# Patient Record
Sex: FEMALE | Race: WHITE | NOT HISPANIC OR LATINO | Employment: FULL TIME | URBAN - METROPOLITAN AREA
[De-identification: names, ages, dates, MRNs, and addresses within clinical notes are randomized per-mention and may not be internally consistent; named-entity substitution may affect disease eponyms.]

---

## 2023-04-24 LAB
ABO GROUP (TYPE) IN BLOOD: NORMAL
ANTIBODY SCREEN: NORMAL
ERYTHROCYTE DISTRIBUTION WIDTH (RATIO) BY AUTOMATED COUNT: 12.5 % (ref 11.5–14.5)
ERYTHROCYTE MEAN CORPUSCULAR HEMOGLOBIN CONCENTRATION (G/DL) BY AUTOMATED: 32.3 G/DL (ref 32–36)
ERYTHROCYTE MEAN CORPUSCULAR VOLUME (FL) BY AUTOMATED COUNT: 89 FL (ref 80–100)
ERYTHROCYTES (10*6/UL) IN BLOOD BY AUTOMATED COUNT: 4.49 X10E12/L (ref 4–5.2)
HEMATOCRIT (%) IN BLOOD BY AUTOMATED COUNT: 40 % (ref 36–46)
HEMOGLOBIN (G/DL) IN BLOOD: 12.9 G/DL (ref 12–16)
HEPATITIS B VIRUS SURFACE AG PRESENCE IN SERUM: NONREACTIVE
HEPATITIS C VIRUS AB PRESENCE IN SERUM: NONREACTIVE
HIV 1/ 2 AG/AB SCREEN: NONREACTIVE
LEUKOCYTES (10*3/UL) IN BLOOD BY AUTOMATED COUNT: 10.2 X10E9/L (ref 4.4–11.3)
PLATELETS (10*3/UL) IN BLOOD AUTOMATED COUNT: 414 X10E9/L (ref 150–450)
REFLEX ADDED, ANEMIA PANEL: NORMAL
RH FACTOR: NORMAL
RUBELLA VIRUS IGG AB: NEGATIVE
SYPHILIS TOTAL AB: NONREACTIVE

## 2023-04-25 LAB
AMPHETAMINE (PRESENCE) IN URINE BY SCREEN METHOD: NORMAL
BARBITURATES PRESENCE IN URINE BY SCREEN METHOD: NORMAL
BENZODIAZEPINE (PRESENCE) IN URINE BY SCREEN METHOD: NORMAL
CANNABINOIDS IN URINE BY SCREEN METHOD: NORMAL
COCAINE (PRESENCE) IN URINE BY SCREEN METHOD: NORMAL
DRUG SCREEN COMMENT URINE: NORMAL
ESTIMATED AVERAGE GLUCOSE FOR HBA1C: 120 MG/DL
FENTANYL URINE: NORMAL
HEMOGLOBIN A1C/HEMOGLOBIN TOTAL IN BLOOD: 5.8 %
METHADONE (PRESENCE) IN URINE BY SCREEN METHOD: NORMAL
OPIATES (PRESENCE) IN URINE BY SCREEN METHOD: NORMAL
OXYCODONE (PRESENCE) IN URINE BY SCREEN METHOD: NORMAL
PHENCYCLIDINE (PRESENCE) IN URINE BY SCREEN METHOD: NORMAL
URINE CULTURE: NORMAL

## 2023-04-27 LAB
CHLAMYDIA TRACH., AMPLIFIED: NEGATIVE
N. GONORRHEA, AMPLIFIED: NEGATIVE

## 2023-05-03 LAB
GLUCOSE THREE HOUR: 64 MG/DL
GLUCOSE TWO HOUR: 62 MG/DL
GLUCOSE, FASTING: 74 MG/DL
GLUCOSE, ONE HOUR: 77 MG/DL
GTTCM: NORMAL

## 2023-09-11 LAB
ERYTHROCYTE DISTRIBUTION WIDTH (RATIO) BY AUTOMATED COUNT: 13.5 % (ref 11.5–14.5)
ERYTHROCYTE MEAN CORPUSCULAR HEMOGLOBIN CONCENTRATION (G/DL) BY AUTOMATED: 32.9 G/DL (ref 32–36)
ERYTHROCYTE MEAN CORPUSCULAR VOLUME (FL) BY AUTOMATED COUNT: 94 FL (ref 80–100)
ERYTHROCYTES (10*6/UL) IN BLOOD BY AUTOMATED COUNT: 3.84 X10E12/L (ref 4–5.2)
GLUCOSE, 1 HR SCREEN, PREG: 71 MG/DL
HEMATOCRIT (%) IN BLOOD BY AUTOMATED COUNT: 36.2 % (ref 36–46)
HEMOGLOBIN (G/DL) IN BLOOD: 11.9 G/DL (ref 12–16)
LEUKOCYTES (10*3/UL) IN BLOOD BY AUTOMATED COUNT: 15.7 X10E9/L (ref 4.4–11.3)
PLATELETS (10*3/UL) IN BLOOD AUTOMATED COUNT: 295 X10E9/L (ref 150–450)
REFLEX ADDED, ANEMIA PANEL: ABNORMAL

## 2023-09-12 LAB — SYPHILIS TOTAL AB: NONREACTIVE

## 2023-09-20 PROBLEM — O09.899 RUBELLA NON-IMMUNE STATUS, ANTEPARTUM (HHS-HCC): Status: ACTIVE | Noted: 2023-09-20

## 2023-09-20 PROBLEM — O99.810 ABNORMAL GLUCOSE AFFECTING PREGNANCY (HHS-HCC): Status: ACTIVE | Noted: 2023-09-20

## 2023-09-20 PROBLEM — Z28.39 RUBELLA NON-IMMUNE STATUS, ANTEPARTUM (HHS-HCC): Status: ACTIVE | Noted: 2023-09-20

## 2023-09-20 PROBLEM — Z34.83 MULTIGRAVIDA IN THIRD TRIMESTER (HHS-HCC): Status: ACTIVE | Noted: 2023-09-20

## 2023-09-20 RX ORDER — ONDANSETRON 4 MG/1
4 TABLET, FILM COATED ORAL EVERY 8 HOURS PRN
COMMUNITY
End: 2023-10-18 | Stop reason: ALTCHOICE

## 2023-09-20 RX ORDER — GUAIFENESIN 1200 MG
325 TABLET, EXTENDED RELEASE 12 HR ORAL
COMMUNITY
End: 2023-12-02 | Stop reason: HOSPADM

## 2023-09-20 RX ORDER — ALBUTEROL SULFATE 90 UG/1
AEROSOL, METERED RESPIRATORY (INHALATION)
COMMUNITY

## 2023-10-02 ENCOUNTER — ROUTINE PRENATAL (OUTPATIENT)
Dept: OBSTETRICS AND GYNECOLOGY | Facility: CLINIC | Age: 23
End: 2023-10-02
Payer: MEDICAID

## 2023-10-02 ENCOUNTER — ANCILLARY PROCEDURE (OUTPATIENT)
Dept: RADIOLOGY | Facility: CLINIC | Age: 23
End: 2023-10-02
Payer: MEDICAID

## 2023-10-02 VITALS — DIASTOLIC BLOOD PRESSURE: 62 MMHG | BODY MASS INDEX: 34.19 KG/M2 | WEIGHT: 199.2 LBS | SYSTOLIC BLOOD PRESSURE: 104 MMHG

## 2023-10-02 DIAGNOSIS — Z34.83 MULTIGRAVIDA IN THIRD TRIMESTER (HHS-HCC): ICD-10-CM

## 2023-10-02 DIAGNOSIS — Z36.4 ULTRASOUND FOR ANTENATAL SCREENING FOR FETAL GROWTH RESTRICTION (HHS-HCC): ICD-10-CM

## 2023-10-02 LAB
GLUCOSE URINE, POC: NEGATIVE
URINE PROTEIN, POC: NEGATIVE

## 2023-10-02 PROCEDURE — 76816 OB US FOLLOW-UP PER FETUS: CPT | Performed by: OBSTETRICS & GYNECOLOGY

## 2023-10-02 PROCEDURE — 81002 URINALYSIS NONAUTO W/O SCOPE: CPT | Performed by: OBSTETRICS & GYNECOLOGY

## 2023-10-02 PROCEDURE — 76816 OB US FOLLOW-UP PER FETUS: CPT

## 2023-10-02 PROCEDURE — 99214 OFFICE O/P EST MOD 30 MIN: CPT | Performed by: OBSTETRICS & GYNECOLOGY

## 2023-10-02 PROCEDURE — 76819 FETAL BIOPHYS PROFIL W/O NST: CPT | Performed by: OBSTETRICS & GYNECOLOGY

## 2023-10-02 PROCEDURE — 76819 FETAL BIOPHYS PROFIL W/O NST: CPT

## 2023-10-02 NOTE — NURSING NOTE
Patient had OB us this morning patient urine protein neg and glucose negative feels like she is leaking fluid all the time

## 2023-10-02 NOTE — PROGRESS NOTES
"Subjective   Patient ID [id number]   Luciana Brown is a 23 y.o.  at 30w5d with a working estimated date of delivery of 2023, by Last Menstrual Period who presents for a routine prenatal visit. She denies vaginal bleeding, leakage of fluid, decreased fetal movements, or contractions.    chief complaint of leaking fluid clear no bleeding.    Her pregnancy is complicated by:  none    Objective   Physical Exam:   Weight: 90.4 kg (199 lb 3.2 oz)  Expected Total Weight Gain: 5 kg (11 lb)-9 kg (19 lb)   Pregravid BMI: 31.74  BP: 104/62  Fetal Heart Rate: 146               Prenatal Labs  Urine Dip:  Lab Results   Component Value Date    KETONESU 5(Trace) mg/dl (A) 2023     Lab Results   Component Value Date    HGB 11.9 (L) 2023    HCT 36.2 2023    HEPBSAG NONREACTIVE 2023     No results found for: \"PAPPA\", \"AFP\", \"HCG\", \"ESTRIOL\", \"INHBA\"  No results found for: \"GLUF\", \"GLUT1\", \"TRAADFF1PE\", \"LOZPFXW7BU\"    Imaging  The most recent ultrasound was performed on The most recent ultrasound study is not finalized with a study GA of The most recent ultrasound study is not finalized and EFW of The most recent ultrasound study is not finalized.  The most recent ultrasound study is not finalized  The most recent ultrasound study is not finalized    Assessment/Plan     Continue prenatal vitamin.  Labs reviewed.  GBS taken.  Expected mode of delivery Vaginal  Follow up in 1 week for a routine prenatal visit.  "

## 2023-10-17 ENCOUNTER — APPOINTMENT (OUTPATIENT)
Dept: OBSTETRICS AND GYNECOLOGY | Facility: CLINIC | Age: 23
End: 2023-10-17
Payer: MEDICAID

## 2023-10-18 ENCOUNTER — ROUTINE PRENATAL (OUTPATIENT)
Dept: OBSTETRICS AND GYNECOLOGY | Facility: CLINIC | Age: 23
End: 2023-10-18
Payer: MEDICAID

## 2023-10-18 VITALS — BODY MASS INDEX: 35.36 KG/M2 | SYSTOLIC BLOOD PRESSURE: 112 MMHG | DIASTOLIC BLOOD PRESSURE: 82 MMHG | WEIGHT: 206 LBS

## 2023-10-18 DIAGNOSIS — Z34.83 MULTIGRAVIDA IN THIRD TRIMESTER (HHS-HCC): Primary | ICD-10-CM

## 2023-10-18 DIAGNOSIS — Z3A.33 33 WEEKS GESTATION OF PREGNANCY (HHS-HCC): ICD-10-CM

## 2023-10-18 PROCEDURE — 99213 OFFICE O/P EST LOW 20 MIN: CPT | Performed by: OBSTETRICS & GYNECOLOGY

## 2023-10-18 NOTE — ASSESSMENT & PLAN NOTE
Patient doing well with no concerns, good fetal movement  Patient with ultrasound on October 2, results pending,  Will repeat ultrasound at 36 weeks, will schedule    PLAN:  Follow-up in 2 weeks

## 2023-10-18 NOTE — PROGRESS NOTES
Subjective   Patient ID [id number]   Luciana Allen is a 23 y.o.  at 33w0d with a working estimated date of delivery of 2023, by Last Menstrual Period who presents for a routine prenatal visit. She denies vaginal bleeding, leakage of fluid, decreased fetal movements, or contractions.    Patient doing well with no concerns.  Ultrasound from  pending    Objective   Physical Exam:   Weight: 93.4 kg (206 lb)  Expected Total Weight Gain: 5 kg (11 lb)-9 kg (19 lb)   Pregravid BMI: 31.74  BP: 112/82  Fetal Heart Rate: 150 Fundal Height (cm): 34 cm             Prenatal Labs  Urine Dip:  Lab Results   Component Value Date    KETONESU 5(Trace) mg/dl (A) 2023    GLUCOSEUR NEGATIVE 10/02/2023     Lab Results   Component Value Date    HGB 11.9 (L) 2023    HCT 36.2 2023    ABO O 2023    HEPBSAG NONREACTIVE 2023         Assessment/Plan   Problem List Items Addressed This Visit             ICD-10-CM    Multigravida in third trimester - Primary Z34.83    33 weeks gestation of pregnancy Z3A.33     Patient doing well with no concerns, good fetal movement  Patient with ultrasound on , results pending,  Will repeat ultrasound at 36 weeks, will schedule    PLAN:  Follow-up in 2 weeks

## 2023-10-23 ENCOUNTER — ANCILLARY PROCEDURE (OUTPATIENT)
Dept: RADIOLOGY | Facility: CLINIC | Age: 23
End: 2023-10-23
Payer: MEDICAID

## 2023-10-23 DIAGNOSIS — Z36.4 ULTRASOUND FOR ANTENATAL SCREENING FOR FETAL GROWTH RESTRICTION (HHS-HCC): ICD-10-CM

## 2023-10-23 PROCEDURE — 76819 FETAL BIOPHYS PROFIL W/O NST: CPT

## 2023-10-23 PROCEDURE — 76816 OB US FOLLOW-UP PER FETUS: CPT

## 2023-10-30 ENCOUNTER — APPOINTMENT (OUTPATIENT)
Dept: RADIOLOGY | Facility: CLINIC | Age: 23
End: 2023-10-30
Payer: MEDICAID

## 2023-10-30 ENCOUNTER — ROUTINE PRENATAL (OUTPATIENT)
Dept: OBSTETRICS AND GYNECOLOGY | Facility: CLINIC | Age: 23
End: 2023-10-30
Payer: MEDICAID

## 2023-10-30 VITALS — WEIGHT: 204.8 LBS | SYSTOLIC BLOOD PRESSURE: 118 MMHG | DIASTOLIC BLOOD PRESSURE: 70 MMHG | BODY MASS INDEX: 35.15 KG/M2

## 2023-10-30 DIAGNOSIS — Z3A.34 34 WEEKS GESTATION OF PREGNANCY (HHS-HCC): Primary | ICD-10-CM

## 2023-10-30 LAB
POC GLUCOSE, URINE: NEGATIVE MG/DL
POC PROTEIN, URINE: NEGATIVE MG/DL

## 2023-10-30 PROCEDURE — 90471 IMMUNIZATION ADMIN: CPT | Performed by: OBSTETRICS & GYNECOLOGY

## 2023-10-30 PROCEDURE — 99213 OFFICE O/P EST LOW 20 MIN: CPT | Performed by: OBSTETRICS & GYNECOLOGY

## 2023-10-30 PROCEDURE — 90715 TDAP VACCINE 7 YRS/> IM: CPT | Performed by: OBSTETRICS & GYNECOLOGY

## 2023-10-30 NOTE — PROGRESS NOTES
"Subjective   Patient ID [id number]   Luciana Allen is a 23 y.o.  at 34w5d with a working estimated date of delivery of 2023, by Last Menstrual Period who presents for a routine prenatal visit. She denies vaginal bleeding, leakage of fluid, decreased fetal movements, or contractions.  C/O lower back pain.   Positive [place].      Her pregnancy is complicated by:      Objective   Physical Exam:   Weight: 92.9 kg (204 lb 12.8 oz)  Expected Total Weight Gain: 5 kg (11 lb)-9 kg (19 lb)   Pregravid BMI: 31.74  BP: 118/70  Fetal Heart Rate: 155               Prenatal Labs  Urine Dip:  Lab Results   Component Value Date    KETONESU 5(Trace) mg/dl (A) 2023    GLUCOSEUR NEGATIVE 10/02/2023     Lab Results   Component Value Date    HGB 11.9 (L) 2023    HCT 36.2 2023    ABO O 2023    HEPBSAG NONREACTIVE 2023     No results found for: \"PAPPA\", \"AFP\", \"HCG\", \"ESTRIOL\", \"INHBA\"  No results found for: \"GLUF\", \"GLUT1\", \"XQGBMPX9CX\", \"DKBUUXR7NQ\"    Imaging  The most recent ultrasound was performed on The most recent ultrasound study is not finalized with a study GA of The most recent ultrasound study is not finalized and EFW of The most recent ultrasound study is not finalized.  The most recent ultrasound study is not finalized  The most recent ultrasound study is not finalized    Assessment/Plan   Problem List Items Addressed This Visit    None  Visit Diagnoses         Codes    34 weeks gestation of pregnancy    -  Primary Z3A.34    Relevant Orders    POCT UA Automated manually resulted (Completed)    Tdap vaccine, age 7 years and older  (BOOSTRIX)          Continue prenatal vitamin.  GBS next visit.  U/S follow up growth in 1 week.   Expected mode of delivery Vaginal  Follow up in 2 week for a routine prenatal visit.  "

## 2023-11-08 ENCOUNTER — ANCILLARY PROCEDURE (OUTPATIENT)
Dept: RADIOLOGY | Facility: CLINIC | Age: 23
End: 2023-11-08
Payer: MEDICAID

## 2023-11-08 DIAGNOSIS — Z3A.33 33 WEEKS GESTATION OF PREGNANCY (HHS-HCC): ICD-10-CM

## 2023-11-08 DIAGNOSIS — O99.891 MATERNAL CONGENITAL CARDIAC ANOMALY COMPLICATING PREGNANCY (HHS-HCC): ICD-10-CM

## 2023-11-08 DIAGNOSIS — Q24.9 MATERNAL CONGENITAL CARDIAC ANOMALY COMPLICATING PREGNANCY (HHS-HCC): ICD-10-CM

## 2023-11-08 DIAGNOSIS — O99.210 OBESITY IN PREGNANCY (HHS-HCC): ICD-10-CM

## 2023-11-08 PROCEDURE — 76819 FETAL BIOPHYS PROFIL W/O NST: CPT | Performed by: OBSTETRICS & GYNECOLOGY

## 2023-11-08 PROCEDURE — 76819 FETAL BIOPHYS PROFIL W/O NST: CPT

## 2023-11-08 PROCEDURE — 76816 OB US FOLLOW-UP PER FETUS: CPT | Performed by: OBSTETRICS & GYNECOLOGY

## 2023-11-08 PROCEDURE — 76816 OB US FOLLOW-UP PER FETUS: CPT

## 2023-11-10 ENCOUNTER — HOSPITAL ENCOUNTER (OUTPATIENT)
Facility: HOSPITAL | Age: 23
Discharge: HOME | End: 2023-11-10
Attending: OBSTETRICS & GYNECOLOGY | Admitting: OBSTETRICS & GYNECOLOGY
Payer: MEDICAID

## 2023-11-10 VITALS
TEMPERATURE: 97.3 F | WEIGHT: 207.45 LBS | RESPIRATION RATE: 18 BRPM | HEIGHT: 64 IN | DIASTOLIC BLOOD PRESSURE: 68 MMHG | OXYGEN SATURATION: 97 % | SYSTOLIC BLOOD PRESSURE: 117 MMHG | BODY MASS INDEX: 35.42 KG/M2 | HEART RATE: 71 BPM

## 2023-11-10 PROCEDURE — 59025 FETAL NON-STRESS TEST: CPT | Performed by: ADVANCED PRACTICE MIDWIFE

## 2023-11-10 PROCEDURE — 99213 OFFICE O/P EST LOW 20 MIN: CPT | Performed by: ADVANCED PRACTICE MIDWIFE

## 2023-11-10 PROCEDURE — 99214 OFFICE O/P EST MOD 30 MIN: CPT | Mod: 25

## 2023-11-10 SDOH — SOCIAL STABILITY: SOCIAL INSECURITY: ARE YOU OR HAVE YOU BEEN THREATENED OR ABUSED PHYSICALLY, EMOTIONALLY, OR SEXUALLY BY ANYONE?: NO

## 2023-11-10 SDOH — SOCIAL STABILITY: SOCIAL INSECURITY: DO YOU FEEL ANYONE HAS EXPLOITED OR TAKEN ADVANTAGE OF YOU FINANCIALLY OR OF YOUR PERSONAL PROPERTY?: NO

## 2023-11-10 SDOH — SOCIAL STABILITY: SOCIAL INSECURITY: ABUSE SCREEN: ADULT

## 2023-11-10 SDOH — SOCIAL STABILITY: SOCIAL INSECURITY: PHYSICAL ABUSE: DENIES

## 2023-11-10 SDOH — HEALTH STABILITY: MENTAL HEALTH: NON-SPECIFIC ACTIVE SUICIDAL THOUGHTS (PAST 1 MONTH): NO

## 2023-11-10 SDOH — HEALTH STABILITY: MENTAL HEALTH: HAVE YOU USED ANY PRESCRIPTION DRUGS OTHER THAN PRESCRIBED IN THE PAST 12 MONTHS?: NO

## 2023-11-10 SDOH — HEALTH STABILITY: MENTAL HEALTH: HAVE YOU USED ANY SUBSTANCES (CANABIS, COCAINE, HEROIN, HALLUCINOGENS, INHALANTS, ETC.) IN THE PAST 12 MONTHS?: NO

## 2023-11-10 SDOH — SOCIAL STABILITY: SOCIAL INSECURITY: ARE THERE ANY APPARENT SIGNS OF INJURIES/BEHAVIORS THAT COULD BE RELATED TO ABUSE/NEGLECT?: NO

## 2023-11-10 SDOH — ECONOMIC STABILITY: HOUSING INSECURITY: DO YOU FEEL UNSAFE GOING BACK TO THE PLACE WHERE YOU ARE LIVING?: NO

## 2023-11-10 SDOH — SOCIAL STABILITY: SOCIAL INSECURITY: HAVE YOU HAD THOUGHTS OF HARMING ANYONE ELSE?: NO

## 2023-11-10 SDOH — SOCIAL STABILITY: SOCIAL INSECURITY: HAS ANYONE EVER THREATENED TO HURT YOUR FAMILY OR YOUR PETS?: NO

## 2023-11-10 SDOH — SOCIAL STABILITY: SOCIAL INSECURITY: DOES ANYONE TRY TO KEEP YOU FROM HAVING/CONTACTING OTHER FRIENDS OR DOING THINGS OUTSIDE YOUR HOME?: NO

## 2023-11-10 SDOH — HEALTH STABILITY: MENTAL HEALTH: STRENGTHS (MUST CHOOSE TWO): SUPPORT FROM FAMILY;DEMONSTRATES EFFECTIVE COPING SKILLS

## 2023-11-10 SDOH — HEALTH STABILITY: MENTAL HEALTH: SUICIDAL BEHAVIOR (LIFETIME): NO

## 2023-11-10 SDOH — HEALTH STABILITY: MENTAL HEALTH: WISH TO BE DEAD (PAST 1 MONTH): NO

## 2023-11-10 SDOH — SOCIAL STABILITY: SOCIAL INSECURITY: VERBAL ABUSE: DENIES

## 2023-11-10 SDOH — HEALTH STABILITY: MENTAL HEALTH: WERE YOU ABLE TO COMPLETE ALL THE BEHAVIORAL HEALTH SCREENINGS?: YES

## 2023-11-10 ASSESSMENT — PATIENT HEALTH QUESTIONNAIRE - PHQ9
SUM OF ALL RESPONSES TO PHQ9 QUESTIONS 1 & 2: 0
1. LITTLE INTEREST OR PLEASURE IN DOING THINGS: NOT AT ALL
2. FEELING DOWN, DEPRESSED OR HOPELESS: NOT AT ALL

## 2023-11-10 ASSESSMENT — PAIN SCALES - GENERAL
PAINLEVEL_OUTOF10: 4
PAINLEVEL_OUTOF10: 4
PAINLEVEL: 4

## 2023-11-10 ASSESSMENT — LIFESTYLE VARIABLES
HOW OFTEN DO YOU HAVE A DRINK CONTAINING ALCOHOL: NEVER
SKIP TO QUESTIONS 9-10: 1
AUDIT-C TOTAL SCORE: 0
HOW OFTEN DO YOU HAVE 6 OR MORE DRINKS ON ONE OCCASION: NEVER
AUDIT-C TOTAL SCORE: 0
HOW MANY STANDARD DRINKS CONTAINING ALCOHOL DO YOU HAVE ON A TYPICAL DAY: PATIENT DOES NOT DRINK

## 2023-11-11 NOTE — H&P
"Obstetrical Admission History and Physical     Luciana Allen is a 23 y.o. . Low back pain    Chief Complaint: Contractions and Decreased Fetal Movement    Assessment/Plan    -IUP at 36.2 wks  -NST reactive  -back pain in pregnancy     PLAN:   -Discharge to home.   -Discussed low suspicion for PTL given cervical exam and no contractions tracing.   -Discussed continued FMCs, labor/srom/bleeding precautions.   -Discussed comfort measures including tylenol up to 1000 mg q 8 hrs, heat, positioning, benadryl or unisom for sleep.   -Keep regularly scheduled OB appointments. GBS next visit.   -Pt assessment and plan reviewed with Dr. Do.   -Pt verbalized understanding and agreement with plan.     Active Problems:  There are no active Hospital Problems.      Pregnancy Problems (from 21 to present)       Problem Noted Resolved    33 weeks gestation of pregnancy 10/18/2023 by Tristan Ramirez MD No    Priority:  Medium      Overview Addendum 2023  8:00 PM by Tristan Ramirez MD     History of bipolar and depression, PTSD:  Ultrasound : EFW 2.53 kg, 25%, normal ISIDRA, BPP                Subjective   Luciana is here complaining of back pain for the past 2 weeks that she feels may be contractions. Denies VB, LOF. States baby has had \"quiet\" moments the past 2 days but has been active since arrival to triage. For her pain, uLciana has tried 500mg of tylenol with minimal relief. She denies urinary symptoms or any other health concerns today.          Obstetrical History   OB History    Para Term  AB Living   4 2 2   1 2   SAB IAB Ectopic Multiple Live Births   1       2      # Outcome Date GA Lbr Reuben/2nd Weight Sex Delivery Anes PTL Lv   4 Current            3 SAB 23 9w5d    Incomplete S      2 Term 20 37w6d  2693 g M Vag-Spont EPI  LILY   1 Term 19 42w0d  2892 g F Vag-Spont EPI N LILY       Past Medical History  Past Medical History:   Diagnosis Date    ADD (attention " deficit disorder)     Bipolar disorder, currently in remission, most recent episode unspecified (CMS/HCC)     History of depressed bipolar disorder    Depression     Personal history of other diseases of the respiratory system     History of asthma    Personal history of other mental and behavioral disorders     History of depression    Personal history of other mental and behavioral disorders     History of attention deficit hyperactivity disorder (ADHD)    Personal history of other mental and behavioral disorders     History of anxiety    Personal history of other mental and behavioral disorders     History of attention deficit disorder    Post-traumatic stress disorder, unspecified     PTSD (post-traumatic stress disorder)        Past Surgical History   Past Surgical History:   Procedure Laterality Date    DILATION AND CURETTAGE OF UTERUS      OTHER SURGICAL HISTORY  10/24/2019    No history of surgery       Social History  Social History     Tobacco Use    Smoking status: Never     Passive exposure: Current    Smokeless tobacco: Not on file   Substance Use Topics    Alcohol use: Not Currently     Substance and Sexual Activity   Drug Use Never       Allergies  Latex     Medications  Medications Prior to Admission   Medication Sig Dispense Refill Last Dose    acetaminophen (TylenoL) 325 mg capsule Take 1 capsule (325 mg) by mouth.   Past Month    albuterol 90 mcg/actuation inhaler Inhale.   More than a month    PNV no.95/ferrous fum/folic ac (PRENATAL ORAL) Take by mouth.   11/10/2023       Objective    Last Vitals  Temp Pulse Resp BP MAP O2 Sat   36 °C (96.8 °F) 71 18 117/68   97 %     Physical Examination  GENERAL: Examination reveals a well developed, well nourished, gravid female in no acute distress. She is alert and cooperative.  LUNGS:  unlabored breathing  FHR is 145, moderate, +accels, -decels  [place] reading: quiet  CERVIX: Fingertip cm dilated, 20 % effaced, -3 station; MEMBRANES are Intact  NEUROLOGICAL:  alert, oriented, normal speech, no focal findings or movement disorder noted  PSYCHOLOGICAL: awake and alert; oriented to person, place, and time    Lab Review

## 2023-11-13 ENCOUNTER — ROUTINE PRENATAL (OUTPATIENT)
Dept: OBSTETRICS AND GYNECOLOGY | Facility: CLINIC | Age: 23
End: 2023-11-13
Payer: MEDICAID

## 2023-11-13 VITALS — DIASTOLIC BLOOD PRESSURE: 70 MMHG | SYSTOLIC BLOOD PRESSURE: 110 MMHG | BODY MASS INDEX: 35.84 KG/M2 | WEIGHT: 208.8 LBS

## 2023-11-13 DIAGNOSIS — Z3A.36 36 WEEKS GESTATION OF PREGNANCY (HHS-HCC): Primary | ICD-10-CM

## 2023-11-13 LAB
POC GLUCOSE, URINE: NEGATIVE MG/DL
POC PROTEIN, URINE: NEGATIVE MG/DL

## 2023-11-13 PROCEDURE — 99213 OFFICE O/P EST LOW 20 MIN: CPT | Performed by: OBSTETRICS & GYNECOLOGY

## 2023-11-13 PROCEDURE — 87081 CULTURE SCREEN ONLY: CPT

## 2023-11-13 NOTE — PROGRESS NOTES
"Subjective   Patient ID [id number]   Luciana Allen is a 23 y.o.  at 36w5d with a working estimated date of delivery of 2023, by Last Menstrual Period who presents for a routine prenatal visit. She denies vaginal bleeding, leakage of fluid, decreased fetal movements, or contractions.  C/O lower back.       Her pregnancy is complicated by:  Lower back pain    Objective   Physical Exam:   Weight: 94.7 kg (208 lb 12.8 oz)  Expected Total Weight Gain: 5 kg (11 lb)-9 kg (19 lb)   Pregravid BMI: 31.74  BP: 110/70  Fetal Heart Rate: 140               Prenatal Labs  Urine Dip:  Lab Results   Component Value Date    KETONESU 5(Trace) mg/dl (A) 2023    GLUCOSEUR NEGATIVE 10/02/2023     Lab Results   Component Value Date    HGB 11.9 (L) 2023    HCT 36.2 2023    ABO O 2023    HEPBSAG NONREACTIVE 2023     No results found for: \"PAPPA\", \"AFP\", \"HCG\", \"ESTRIOL\", \"INHBA\"  No results found for: \"GLUF\", \"GLUT1\", \"ZAOLEQM2LQ\", \"YFPSOFA8RH\"    Imaging  The most recent ultrasound was performed on   with a study GA of   and EFW of  .          Assessment/Plan   Problem List Items Addressed This Visit    None  Visit Diagnoses         Codes    36 weeks gestation of pregnancy    -  Primary Z3A.36    Relevant Orders    POCT UA Automated manually resulted (Completed)          Continue prenatal vitamin.  Labs reviewed.  GBS taken.  Expected mode of delivery Vaginal  Follow up in 1 week for a routine prenatal visit.  Induction at 39 weeks.   "

## 2023-11-16 LAB — GP B STREP GENITAL QL CULT: NORMAL

## 2023-11-20 ENCOUNTER — ROUTINE PRENATAL (OUTPATIENT)
Dept: OBSTETRICS AND GYNECOLOGY | Facility: CLINIC | Age: 23
End: 2023-11-20
Payer: MEDICAID

## 2023-11-20 VITALS — BODY MASS INDEX: 36.42 KG/M2 | WEIGHT: 212.2 LBS | SYSTOLIC BLOOD PRESSURE: 110 MMHG | DIASTOLIC BLOOD PRESSURE: 60 MMHG

## 2023-11-20 DIAGNOSIS — Z3A.37 37 WEEKS GESTATION OF PREGNANCY (HHS-HCC): Primary | ICD-10-CM

## 2023-11-20 LAB
POC GLUCOSE, URINE: NEGATIVE MG/DL
POC PROTEIN, URINE: NEGATIVE MG/DL

## 2023-11-20 PROCEDURE — 99213 OFFICE O/P EST LOW 20 MIN: CPT | Performed by: OBSTETRICS & GYNECOLOGY

## 2023-11-20 NOTE — PROGRESS NOTES
"Subjective   Patient ID [id number]   Luciana Allen is a 23 y.o.  at 37w5d with a working estimated date of delivery of 2023, by Last Menstrual Period who presents for a routine prenatal visit. She denies vaginal bleeding, leakage of fluid, decreased fetal movements, or contractions.  Positive cramping.     Her pregnancy is complicated by:  none    Objective   Physical Exam:   Weight: 96.3 kg (212 lb 3.2 oz)  Expected Total Weight Gain: 5 kg (11 lb)-9 kg (19 lb)   Pregravid BMI: 31.74  BP: 110/60  Fetal Heart Rate: 151               Prenatal Labs  Urine Dip:  Lab Results   Component Value Date    KETONESU 5(Trace) mg/dl (A) 2023    GLUCOSEUR NEGATIVE 10/02/2023     Lab Results   Component Value Date    HGB 11.9 (L) 2023    HCT 36.2 2023    ABO O 2023    HEPBSAG NONREACTIVE 2023     No results found for: \"PAPPA\", \"AFP\", \"HCG\", \"ESTRIOL\", \"INHBA\"  No results found for: \"GLUF\", \"GLUT1\", \"HNTNHZN9ME\", \"DDVOWLV6SV\"    Imaging  The most recent ultrasound was performed on   with a study GA of   and EFW of  .          Assessment/Plan   Problem List Items Addressed This Visit    None  Visit Diagnoses         Codes    37 weeks gestation of pregnancy    -  Primary Z3A.37    Relevant Orders    POCT UA Automated manually resulted (Completed)          Continue prenatal vitamin.  Labs reviewed.  GBS taken negative  Expected mode of delivery Vaginal  Induction next week at 39 weeks.   Follow up in 1 week for a routine prenatal visit.  "

## 2023-11-21 ENCOUNTER — HOSPITAL ENCOUNTER (OUTPATIENT)
Facility: HOSPITAL | Age: 23
Discharge: HOME | End: 2023-11-22
Attending: OBSTETRICS & GYNECOLOGY | Admitting: OBSTETRICS & GYNECOLOGY
Payer: MEDICAID

## 2023-11-21 DIAGNOSIS — Z3A.37 37 WEEKS GESTATION OF PREGNANCY (HHS-HCC): ICD-10-CM

## 2023-11-21 DIAGNOSIS — O47.9 BRAXTON HICKS CONTRACTIONS (HHS-HCC): Primary | ICD-10-CM

## 2023-11-22 VITALS
HEART RATE: 98 BPM | SYSTOLIC BLOOD PRESSURE: 126 MMHG | OXYGEN SATURATION: 96 % | DIASTOLIC BLOOD PRESSURE: 64 MMHG | RESPIRATION RATE: 18 BRPM | TEMPERATURE: 97 F | BODY MASS INDEX: 34.27 KG/M2 | WEIGHT: 199.63 LBS

## 2023-11-22 PROBLEM — O41.00X0 OLIGOHYDRAMNIOS (HHS-HCC): Status: RESOLVED | Noted: 2023-11-22 | Resolved: 2023-11-22

## 2023-11-22 PROBLEM — L25.9 CONTACT DERMATITIS: Status: RESOLVED | Noted: 2023-11-22 | Resolved: 2023-11-22

## 2023-11-22 PROBLEM — R10.2 PELVIC PRESSURE IN PREGNANCY (HHS-HCC): Status: RESOLVED | Noted: 2023-11-22 | Resolved: 2023-11-22

## 2023-11-22 PROBLEM — O26.899 PELVIC PRESSURE IN PREGNANCY (HHS-HCC): Status: RESOLVED | Noted: 2023-11-22 | Resolved: 2023-11-22

## 2023-11-22 PROBLEM — R09.89 RESPIRATORY SYMPTOMS: Status: RESOLVED | Noted: 2023-11-22 | Resolved: 2023-11-22

## 2023-11-22 PROBLEM — Z11.52 ENCOUNTER FOR SCREENING FOR COVID-19: Status: RESOLVED | Noted: 2023-11-22 | Resolved: 2023-11-22

## 2023-11-22 PROBLEM — R09.81 NASAL CONGESTION: Status: RESOLVED | Noted: 2023-11-22 | Resolved: 2023-11-22

## 2023-11-22 PROBLEM — J06.9 UPPER RESPIRATORY INFECTION, ACUTE: Status: RESOLVED | Noted: 2023-11-22 | Resolved: 2023-11-22

## 2023-11-22 PROBLEM — O47.9 BRAXTON HICKS CONTRACTIONS (HHS-HCC): Status: ACTIVE | Noted: 2023-11-22

## 2023-11-22 PROBLEM — J01.90 SINUSITIS, ACUTE: Status: RESOLVED | Noted: 2023-11-22 | Resolved: 2023-11-22

## 2023-11-22 PROCEDURE — 2500000001 HC RX 250 WO HCPCS SELF ADMINISTERED DRUGS (ALT 637 FOR MEDICARE OP): Performed by: ADVANCED PRACTICE MIDWIFE

## 2023-11-22 PROCEDURE — 99214 OFFICE O/P EST MOD 30 MIN: CPT | Performed by: ADVANCED PRACTICE MIDWIFE

## 2023-11-22 PROCEDURE — 99214 OFFICE O/P EST MOD 30 MIN: CPT

## 2023-11-22 RX ORDER — HYDROXYZINE HYDROCHLORIDE 25 MG/1
50 TABLET, FILM COATED ORAL ONCE
Status: COMPLETED | OUTPATIENT
Start: 2023-11-22 | End: 2023-11-22

## 2023-11-22 RX ADMIN — HYDROXYZINE HYDROCHLORIDE 50 MG: 25 TABLET, FILM COATED ORAL at 01:19

## 2023-11-22 NOTE — H&P
Obstetrical Admission History and Physical     Luciana Allen is a 23 y.o.  at 38w0d. CARMINE: 2023, by Last Menstrual Period who presents with complaint of contractions earlier today which have decreased in frequency and intensity now. She has had prenatal care with Dr. Yousif .    Assessment    Luciana Allen is a 23 y.o.  at 38w0d. CARMINE: 2023, by Last Menstrual Period.   FHT Category 1  [place] contractions    Plan    NST   Plan for discharge home with labor precautions; IOL planned for 39 weeks  Vistaril for rest    TONNY Caldera    Subjective     Chief Complaint: Contractions (Q. 3-5 minutes while at home)    Luciana is here complaining of q3-5 min contractions at home; not now. Good fetal movement. Denies vaginal bleeding., C/O of occasional contractions., Denies leaking of fluid.         Pregnancy Problems (from 21 to present)       Problem Noted Resolved    [place] contractions 2023 by TONNY Caldera No    Priority:  Medium      37 weeks gestation of pregnancy 2023 by Sigrid Yousif MD No    Priority:  Medium      33 weeks gestation of pregnancy 10/18/2023 by Tristan Ramirez MD No    Priority:  Medium      Overview Addendum 2023  8:00 PM by Tristan Ramirez MD     History of bipolar and depression, PTSD:  Ultrasound : EFW 2.53 kg, 25%, normal ISIDRA, BPP                   Obstetrical History   OB History    Para Term  AB Living   4 2 2   1 2   SAB IAB Ectopic Multiple Live Births   1       2      # Outcome Date GA Lbr Reuben/2nd Weight Sex Delivery Anes PTL Lv   4 Current            3 SAB 23 9w5d    Incomplete S      2 Term 20 37w6d  2693 g M Vag-Spont EPI  LILY   1 Term 19 42w0d  2892 g F Vag-Spont EPI N LILY       Past Medical History  Past Medical History:   Diagnosis Date    ADD (attention deficit disorder)     Bipolar disorder, currently in remission, most recent episode unspecified (CMS/[facility])      History of depressed bipolar disorder    Depression     Personal history of other diseases of the respiratory system     History of asthma    Personal history of other mental and behavioral disorders     History of depression    Personal history of other mental and behavioral disorders     History of attention deficit hyperactivity disorder (ADHD)    Personal history of other mental and behavioral disorders     History of anxiety    Personal history of other mental and behavioral disorders     History of attention deficit disorder    Post-traumatic stress disorder, unspecified     PTSD (post-traumatic stress disorder)        Past Surgical History   Past Surgical History:   Procedure Laterality Date    DILATION AND CURETTAGE OF UTERUS      OTHER SURGICAL HISTORY  10/24/2019    No history of surgery       Social History  Social History     Tobacco Use    Smoking status: Never     Passive exposure: Current    Smokeless tobacco: Not on file   Substance Use Topics    Alcohol use: Not Currently     Substance and Sexual Activity   Drug Use Never       Allergies  Latex     Medications  Medications Prior to Admission   Medication Sig Dispense Refill Last Dose    acetaminophen (TylenoL) 325 mg capsule Take 1 capsule (325 mg) by mouth.       albuterol 90 mcg/actuation inhaler Inhale.       PNV no.95/ferrous fum/folic ac (PRENATAL ORAL) Take by mouth.          Objective    Last Vitals  Temp Pulse Resp BP MAP O2 Sat   36.1 °C (97 °F) 98 18 126/64   96 %     Physical Examination    GENERAL: Examination reveals a well developed, well nourished, gravid female in no acute distress and whose affect is appropriate. She is alert and cooperative and emotional.  ABDOMEN: soft, gravid, nontender, nondistended, no abnormal masses, no epigastric pain  FHR is  , with Accelerations, and a Category I tracing.    [place] reading:    CERVIX: 2 cm dilated, 50 % effaced, -2 station; MEMBRANES are    SKIN: normal coloration and turgor, no  ezekiel  NEUROLOGICAL: alert, oriented, normal speech, no focal findings or movement disorder noted  PSYCHOLOGICAL: awake and alert; oriented to person, place, and time    Fetal Monitoring      Baseline FHR: 140 per minute  Variability: moderate  Accelerations: yes  Decelerations: none  TOCO: none    Cervical Exam:  2 cm dilated, 50 effaced, -2 station, cervical position posterior, consistency soft    Membrane status: intact      Lab Review  Labs in chart were reviewed.

## 2023-11-22 NOTE — NURSING NOTE
Urinalysis results 11/22/23 0026    GLU NEG  DEBI NEG  KET NEG  GD 1.015  BLO NEG  pH 7.0  PRO NEG  URO 0.2 E.U./dL  NIT NEG  GABI NEG

## 2023-11-27 ENCOUNTER — ROUTINE PRENATAL (OUTPATIENT)
Dept: OBSTETRICS AND GYNECOLOGY | Facility: CLINIC | Age: 23
End: 2023-11-27
Payer: MEDICAID

## 2023-11-27 ENCOUNTER — PREP FOR PROCEDURE (OUTPATIENT)
Dept: OBSTETRICS AND GYNECOLOGY | Facility: CLINIC | Age: 23
End: 2023-11-27

## 2023-11-27 VITALS — BODY MASS INDEX: 36.22 KG/M2 | SYSTOLIC BLOOD PRESSURE: 110 MMHG | DIASTOLIC BLOOD PRESSURE: 68 MMHG | WEIGHT: 211 LBS

## 2023-11-27 DIAGNOSIS — Z3A.38 38 WEEKS GESTATION OF PREGNANCY (HHS-HCC): Primary | ICD-10-CM

## 2023-11-27 DIAGNOSIS — Z3A.39 39 WEEKS GESTATION OF PREGNANCY (HHS-HCC): Primary | ICD-10-CM

## 2023-11-27 LAB
POC GLUCOSE, URINE: NEGATIVE MG/DL
POC PROTEIN, URINE: NEGATIVE MG/DL

## 2023-11-27 PROCEDURE — 99213 OFFICE O/P EST LOW 20 MIN: CPT | Performed by: OBSTETRICS & GYNECOLOGY

## 2023-11-27 NOTE — PROGRESS NOTES
"Subjective   Patient ID [id number]   Luciana Allen is a 23 y.o.  at 38w5d with a working estimated date of delivery of 2023, by Last Menstrual Period who presents for a routine prenatal visit. She denies vaginal bleeding, leakage of fluid, decreased fetal movements, or contractions.  Doing well. Had CTX last week was seen at [facility] sent home after 2 hours.      Her pregnancy is complicated by:  None    Objective   Physical Exam:   Weight: 95.7 kg (211 lb)  Expected Total Weight Gain: 5 kg (11 lb)-9 kg (19 lb)   Pregravid BMI: 31.74  BP: 110/68  Fetal Heart Rate: 146               Prenatal Labs  Urine Dip:  Lab Results   Component Value Date    KETONESU 5(Trace) mg/dl (A) 2023    GLUCOSEUR NEGATIVE 10/02/2023     Lab Results   Component Value Date    HGB 11.9 (L) 2023    HCT 36.2 2023    ABO O 2023    HEPBSAG NONREACTIVE 2023     No results found for: \"PAPPA\", \"AFP\", \"HCG\", \"ESTRIOL\", \"INHBA\"  No results found for: \"GLUF\", \"GLUT1\", \"ANIGPYA2AW\", \"CLPNEOQ2KN\"    Imaging  The most recent ultrasound was performed on   with a study GA of   and EFW of  .          Assessment/Plan   Problem List Items Addressed This Visit    None  Visit Diagnoses         Codes    38 weeks gestation of pregnancy    -  Primary Z3A.38    Relevant Orders    POCT UA Automated manually resulted (Completed)          Continue prenatal vitamin.  Labs reviewed.  GBS taken.  Expected mode of delivery Vaginal Induction in couple of days.   Follow up in 2 weeks  for pp visit.   "

## 2023-11-30 ENCOUNTER — ANESTHESIA (OUTPATIENT)
Dept: OBSTETRICS AND GYNECOLOGY | Facility: HOSPITAL | Age: 23
End: 2023-11-30
Payer: MEDICAID

## 2023-11-30 ENCOUNTER — APPOINTMENT (OUTPATIENT)
Dept: OBSTETRICS AND GYNECOLOGY | Facility: HOSPITAL | Age: 23
End: 2023-11-30
Payer: MEDICAID

## 2023-11-30 ENCOUNTER — HOSPITAL ENCOUNTER (INPATIENT)
Facility: HOSPITAL | Age: 23
LOS: 2 days | Discharge: HOME | End: 2023-12-02
Attending: OBSTETRICS & GYNECOLOGY | Admitting: ADVANCED PRACTICE MIDWIFE
Payer: MEDICAID

## 2023-11-30 ENCOUNTER — ANESTHESIA EVENT (OUTPATIENT)
Dept: OBSTETRICS AND GYNECOLOGY | Facility: HOSPITAL | Age: 23
End: 2023-11-30
Payer: MEDICAID

## 2023-11-30 DIAGNOSIS — Z3A.39 39 WEEKS GESTATION OF PREGNANCY (HHS-HCC): ICD-10-CM

## 2023-11-30 PROBLEM — I38 VALVULAR HEART DISEASE: Status: ACTIVE | Noted: 2023-11-30

## 2023-11-30 PROBLEM — T88.3XXA MALIGNANT HYPERTHERMIA: Status: ACTIVE | Noted: 2023-11-30

## 2023-11-30 PROBLEM — J45.20 INTERMITTENT ASTHMA (HHS-HCC): Status: ACTIVE | Noted: 2023-11-30

## 2023-11-30 PROBLEM — Z34.90 ENCOUNTER FOR INDUCTION OF LABOR (HHS-HCC): Status: ACTIVE | Noted: 2023-11-30

## 2023-11-30 LAB
ABO GROUP (TYPE) IN BLOOD: NORMAL
ANTIBODY SCREEN: NORMAL
ERYTHROCYTE [DISTWIDTH] IN BLOOD BY AUTOMATED COUNT: 12.6 % (ref 11.5–14.5)
HCT VFR BLD AUTO: 34.1 % (ref 36–46)
HGB BLD-MCNC: 11.3 G/DL (ref 12–16)
MCH RBC QN AUTO: 30.5 PG (ref 26–34)
MCHC RBC AUTO-ENTMCNC: 33.1 G/DL (ref 32–36)
MCV RBC AUTO: 92 FL (ref 80–100)
NRBC BLD-RTO: 0 /100 WBCS (ref 0–0)
PLATELET # BLD AUTO: 289 X10*3/UL (ref 150–450)
RBC # BLD AUTO: 3.7 X10*6/UL (ref 4–5.2)
RH FACTOR (ANTIGEN D): NORMAL
T PALLIDUM AB SER QL: NONREACTIVE
WBC # BLD AUTO: 13.3 X10*3/UL (ref 4.4–11.3)

## 2023-11-30 PROCEDURE — 1210000001 HC SEMI-PRIVATE ROOM DAILY

## 2023-11-30 PROCEDURE — 3700000002 HC GENERAL ANESTHESIA TIME - EACH INCREMENTAL 1 MINUTE: Performed by: NURSE ANESTHETIST, CERTIFIED REGISTERED

## 2023-11-30 PROCEDURE — 86780 TREPONEMA PALLIDUM: CPT | Mod: AHULAB | Performed by: ADVANCED PRACTICE MIDWIFE

## 2023-11-30 PROCEDURE — 51701 INSERT BLADDER CATHETER: CPT

## 2023-11-30 PROCEDURE — 2500000004 HC RX 250 GENERAL PHARMACY W/ HCPCS (ALT 636 FOR OP/ED): Performed by: ADVANCED PRACTICE MIDWIFE

## 2023-11-30 PROCEDURE — 01967 NEURAXL LBR ANES VAG DLVR: CPT | Performed by: NURSE ANESTHETIST, CERTIFIED REGISTERED

## 2023-11-30 PROCEDURE — 7210000002 HC LABOR PER HOUR

## 2023-11-30 PROCEDURE — 3700000001 HC GENERAL ANESTHESIA TIME - INITIAL BASE CHARGE: Performed by: NURSE ANESTHETIST, CERTIFIED REGISTERED

## 2023-11-30 PROCEDURE — 3E033VJ INTRODUCTION OF OTHER HORMONE INTO PERIPHERAL VEIN, PERCUTANEOUS APPROACH: ICD-10-PCS | Performed by: MIDWIFE

## 2023-11-30 PROCEDURE — 85027 COMPLETE CBC AUTOMATED: CPT | Performed by: ADVANCED PRACTICE MIDWIFE

## 2023-11-30 PROCEDURE — 86850 RBC ANTIBODY SCREEN: CPT | Performed by: ADVANCED PRACTICE MIDWIFE

## 2023-11-30 PROCEDURE — 36415 COLL VENOUS BLD VENIPUNCTURE: CPT | Performed by: ADVANCED PRACTICE MIDWIFE

## 2023-11-30 PROCEDURE — 2500000004 HC RX 250 GENERAL PHARMACY W/ HCPCS (ALT 636 FOR OP/ED): Performed by: NURSE ANESTHETIST, CERTIFIED REGISTERED

## 2023-11-30 RX ORDER — OXYTOCIN 10 [USP'U]/ML
10 INJECTION, SOLUTION INTRAMUSCULAR; INTRAVENOUS ONCE AS NEEDED
Status: DISCONTINUED | OUTPATIENT
Start: 2023-11-30 | End: 2023-12-01

## 2023-11-30 RX ORDER — SODIUM CHLORIDE, SODIUM LACTATE, POTASSIUM CHLORIDE, CALCIUM CHLORIDE 600; 310; 30; 20 MG/100ML; MG/100ML; MG/100ML; MG/100ML
125 INJECTION, SOLUTION INTRAVENOUS CONTINUOUS
Status: DISCONTINUED | OUTPATIENT
Start: 2023-11-30 | End: 2023-12-02 | Stop reason: HOSPADM

## 2023-11-30 RX ORDER — METOCLOPRAMIDE 10 MG/1
10 TABLET ORAL EVERY 6 HOURS PRN
Status: DISCONTINUED | OUTPATIENT
Start: 2023-11-30 | End: 2023-12-02 | Stop reason: HOSPADM

## 2023-11-30 RX ORDER — ONDANSETRON 4 MG/1
4 TABLET, FILM COATED ORAL EVERY 6 HOURS PRN
Status: DISCONTINUED | OUTPATIENT
Start: 2023-11-30 | End: 2023-12-01

## 2023-11-30 RX ORDER — METOCLOPRAMIDE HYDROCHLORIDE 5 MG/ML
10 INJECTION INTRAMUSCULAR; INTRAVENOUS EVERY 6 HOURS PRN
Status: DISCONTINUED | OUTPATIENT
Start: 2023-11-30 | End: 2023-12-02 | Stop reason: HOSPADM

## 2023-11-30 RX ORDER — METHYLERGONOVINE MALEATE 0.2 MG/ML
0.2 INJECTION INTRAVENOUS ONCE AS NEEDED
Status: DISCONTINUED | OUTPATIENT
Start: 2023-11-30 | End: 2023-12-01

## 2023-11-30 RX ORDER — OXYTOCIN/0.9 % SODIUM CHLORIDE 30/500 ML
2-30 PLASTIC BAG, INJECTION (ML) INTRAVENOUS CONTINUOUS
Status: DISCONTINUED | OUTPATIENT
Start: 2023-11-30 | End: 2023-12-02 | Stop reason: HOSPADM

## 2023-11-30 RX ORDER — ONDANSETRON HYDROCHLORIDE 2 MG/ML
4 INJECTION, SOLUTION INTRAVENOUS EVERY 6 HOURS PRN
Status: DISCONTINUED | OUTPATIENT
Start: 2023-11-30 | End: 2023-12-01

## 2023-11-30 RX ORDER — TERBUTALINE SULFATE 1 MG/ML
0.25 INJECTION SUBCUTANEOUS ONCE AS NEEDED
Status: DISCONTINUED | OUTPATIENT
Start: 2023-11-30 | End: 2023-12-02 | Stop reason: HOSPADM

## 2023-11-30 RX ORDER — ALBUTEROL SULFATE 90 UG/1
2 AEROSOL, METERED RESPIRATORY (INHALATION) EVERY 4 HOURS PRN
Status: DISCONTINUED | OUTPATIENT
Start: 2023-11-30 | End: 2023-12-02 | Stop reason: HOSPADM

## 2023-11-30 RX ORDER — CARBOPROST TROMETHAMINE 250 UG/ML
250 INJECTION, SOLUTION INTRAMUSCULAR ONCE AS NEEDED
Status: DISCONTINUED | OUTPATIENT
Start: 2023-11-30 | End: 2023-12-01

## 2023-11-30 RX ORDER — LABETALOL HYDROCHLORIDE 5 MG/ML
20 INJECTION, SOLUTION INTRAVENOUS ONCE AS NEEDED
Status: DISCONTINUED | OUTPATIENT
Start: 2023-11-30 | End: 2023-12-01

## 2023-11-30 RX ORDER — OXYTOCIN/0.9 % SODIUM CHLORIDE 30/500 ML
60 PLASTIC BAG, INJECTION (ML) INTRAVENOUS ONCE AS NEEDED
Status: DISCONTINUED | OUTPATIENT
Start: 2023-11-30 | End: 2023-12-01

## 2023-11-30 RX ORDER — TRANEXAMIC ACID 100 MG/ML
1000 INJECTION, SOLUTION INTRAVENOUS ONCE AS NEEDED
Status: DISCONTINUED | OUTPATIENT
Start: 2023-11-30 | End: 2023-12-01

## 2023-11-30 RX ORDER — LOPERAMIDE HYDROCHLORIDE 2 MG/1
4 CAPSULE ORAL EVERY 2 HOUR PRN
Status: DISCONTINUED | OUTPATIENT
Start: 2023-11-30 | End: 2023-12-01

## 2023-11-30 RX ORDER — HYDRALAZINE HYDROCHLORIDE 20 MG/ML
5 INJECTION INTRAMUSCULAR; INTRAVENOUS ONCE AS NEEDED
Status: DISCONTINUED | OUTPATIENT
Start: 2023-11-30 | End: 2023-12-01

## 2023-11-30 RX ORDER — LIDOCAINE HYDROCHLORIDE 10 MG/ML
30 INJECTION INFILTRATION; PERINEURAL ONCE AS NEEDED
Status: DISCONTINUED | OUTPATIENT
Start: 2023-11-30 | End: 2023-12-02 | Stop reason: HOSPADM

## 2023-11-30 RX ORDER — FENTANYL/ROPIVACAINE/NS/PF 2MCG/ML-.2
0-25 PLASTIC BAG, INJECTION (ML) INJECTION CONTINUOUS
Status: DISCONTINUED | OUTPATIENT
Start: 2023-11-30 | End: 2023-12-02 | Stop reason: HOSPADM

## 2023-11-30 RX ORDER — MISOPROSTOL 200 UG/1
800 TABLET ORAL ONCE AS NEEDED
Status: DISCONTINUED | OUTPATIENT
Start: 2023-11-30 | End: 2023-12-01

## 2023-11-30 RX ORDER — NIFEDIPINE 10 MG/1
10 CAPSULE ORAL ONCE AS NEEDED
Status: DISCONTINUED | OUTPATIENT
Start: 2023-11-30 | End: 2023-12-01

## 2023-11-30 RX ADMIN — ONDANSETRON 4 MG: 2 INJECTION INTRAMUSCULAR; INTRAVENOUS at 23:47

## 2023-11-30 RX ADMIN — Medication 10 ML/HR: at 17:21

## 2023-11-30 RX ADMIN — Medication 4 ML: at 17:15

## 2023-11-30 RX ADMIN — Medication 2 ML: at 17:20

## 2023-11-30 RX ADMIN — Medication 2 MILLI-UNITS/MIN: at 11:11

## 2023-11-30 RX ADMIN — SODIUM CHLORIDE, POTASSIUM CHLORIDE, SODIUM LACTATE AND CALCIUM CHLORIDE 125 ML/HR: 600; 310; 30; 20 INJECTION, SOLUTION INTRAVENOUS at 13:44

## 2023-11-30 RX ADMIN — Medication 4 ML: at 17:10

## 2023-11-30 RX ADMIN — SODIUM CHLORIDE, POTASSIUM CHLORIDE, SODIUM LACTATE AND CALCIUM CHLORIDE 125 ML/HR: 600; 310; 30; 20 INJECTION, SOLUTION INTRAVENOUS at 09:28

## 2023-11-30 RX ADMIN — SODIUM CHLORIDE, POTASSIUM CHLORIDE, SODIUM LACTATE AND CALCIUM CHLORIDE 125 ML/HR: 600; 310; 30; 20 INJECTION, SOLUTION INTRAVENOUS at 18:01

## 2023-11-30 SDOH — HEALTH STABILITY: MENTAL HEALTH: CURRENT SMOKER: 0

## 2023-11-30 ASSESSMENT — PAIN SCALES - GENERAL
PAINLEVEL_OUTOF10: 2
PAINLEVEL_OUTOF10: 0 - NO PAIN
PAINLEVEL_OUTOF10: 4
PAINLEVEL_OUTOF10: 0 - NO PAIN
PAINLEVEL_OUTOF10: 4

## 2023-11-30 NOTE — NURSING NOTE
"Patient called for RN assistance reports feeling light headed and dizzy. Patient has nausea and vomiting.  BP 89/50. Dr. Pena notified and ordered RN to give patient a fluid bolus. Bolus infusing and patient reports \"feeling better\" at this time.   "

## 2023-11-30 NOTE — ANESTHESIA PREPROCEDURE EVALUATION
Patient: Luciana Allen    Evaluation Method: In-person visit    Procedure Information    Date: 11/30/23  Procedure: Labor Analgesia         Relevant Problems   Anesthesia   (+) Malignant hyperthermia (Suspected family history of malignant hyperthermia.)      Cardiovascular   (+) Valvular heart disease      Endocrine (within normal limits)      GI (within normal limits)      /Renal (within normal limits)      Neuro/Psych (within normal limits)      Pulmonary   (+) Intermittent asthma      GI/Hepatic (within normal limits)      Hematology (within normal limits)      Musculoskeletal (within normal limits)      Eyes, Ears, Nose, and Throat (within normal limits)       Clinical information reviewed:   Tobacco  Allergies  Meds   Med Hx  Surg Hx   Fam Hx  Soc Hx        NPO Detail:  No data recorded     OB/GYN     Physical Exam    Airway  Mallampati: II  TM distance: >3 FB  Neck ROM: full     Cardiovascular - normal exam     Dental - normal exam     Pulmonary - normal exam     Abdominal - normal exam             Anesthesia Plan    ASA 3     epidural     The patient is not a current smoker.  Patient was not previously instructed to abstain from smoking on day of procedure.  Patient did not smoke on day of procedure.    Anesthetic plan and risks discussed with patient.  Use of blood products discussed with patient who consented to blood products.    Plan discussed with CRNA.

## 2023-11-30 NOTE — ANESTHESIA PROCEDURE NOTES
Epidural Block    Patient location during procedure: OB  Start time: 11/30/2023 5:00 PM  End time: 11/30/2023 5:09 PM  Reason for block: labor analgesia  Staffing  Performed: CRNA   Authorized by: BROOKS Banegas    Performed by: BROOKS Banegas    Preanesthetic Checklist  Completed: patient identified, IV checked, risks and benefits discussed, surgical consent, pre-op evaluation, timeout performed and sterile techniques followed  Block Timeout  RN/Licensed healthcare professional reads aloud to the Anesthesia provider and entire team: Patient identity, procedure with side and site, patient position, and as applicable the availability of implants/special equipment/special requirements.  Patient on coagulant treatment: no  Timeout performed at: 11/30/2023 4:58 PM  Block Placement  Patient position: sitting  Prep: ChloraPrep  Sterility prep: cap, drape, gloves, hand and mask  Sedation level: no sedation  Patient monitoring: blood pressure, continuous pulse oximetry and heart rate  Approach: midline  Local numbing: lidocaine 1% to skin and subcutaneous tissues  Vertebral space: lumbar  Lumbar location: L3-L4  Epidural  Loss of resistance technique: saline  Guidance: landmark technique        Needle  Needle type: Tuohy   Needle gauge: 18  Needle length: 8.9cm  Needle insertion depth: 6 cm  Catheter size: 20 G  Catheter at skin depth: 12 cm  Catheter securement method: clear occlusive dressing, liquid medical adhesive and surgical tape    Test dose: lidocaine 1.5% with epinephrine 1-to-200,000 (3cc)  Test dose given at 11/30/2023 5:05 PM  Test dose: lidocaine 1.5% with epinephrine 1-to-200,000 (3cc)  Test dose result: no positive test dose    PCEA  Medication concentration used: 0.2% Ropivacaine with 2 mcg/mL Fentanyl  Dose (mL): 5  Lockout (minutes): 30  1-Hour Limit (boluses/hr): 2  Basal Rate: 10        Assessment  Sensory level: T10 bilateral  Block outcome: patient comfortable  Number of attempts:  1  Events: no positive test dose  Procedure assessment: patient tolerated procedure well with no immediate complications  Additional Notes  Pain 6/10 with ctx before dosing of epidural. Feeling 0/10 pain, equal pressure bilaterally with contractions at 5:39 PM

## 2023-11-30 NOTE — PROGRESS NOTES
Comfortable with epidural    Fht 130, mod ras, +accels, no decels  [place]: 4-5, pit @8mu/min\  Ve: 5/70/-2    Iup at 39.1  IOL, latent phase progressing  Fht cat 1    Continue to titrate pit per protocol  [place] aline Polanco, vilma

## 2023-11-30 NOTE — SIGNIFICANT EVENT
Pt reported to CRNA that she has a family history of malignant hyperthermia. Anesthesia attending consulted. They will prepare OR 1 in the event of a c/s and we are cleared to continue with this IOL.    Pt feeling contractions but mostly comfortable    Fht 140, mod ras, +accels, no decels  [place] q2, pit @6mu/min  CRB out  Ve: /-3    Iup at 39.1  IOL, latent phase  Fht category 1  Gbs neg    Continue current course  Epidural when desired  Anticipate aline Polanco cnm

## 2023-11-30 NOTE — PROGRESS NOTES
Pt comfortable    Fht 130, mod ras, +accels, no decels  [place]: rare, crb still in place, pit @6mu/min  Ve def'd    Iup at 39.1  IOL, crb/pit on board  Fht cat 1    Continue current course  Arom when appropriate  Pain meds if desired  Anticipate aline Polanco cnm

## 2023-12-01 PROCEDURE — 10907ZC DRAINAGE OF AMNIOTIC FLUID, THERAPEUTIC FROM PRODUCTS OF CONCEPTION, VIA NATURAL OR ARTIFICIAL OPENING: ICD-10-PCS | Performed by: MIDWIFE

## 2023-12-01 PROCEDURE — 59409 OBSTETRICAL CARE: CPT | Performed by: MIDWIFE

## 2023-12-01 PROCEDURE — 7100000016 HC LABOR RECOVERY PER HOUR

## 2023-12-01 PROCEDURE — 2500000001 HC RX 250 WO HCPCS SELF ADMINISTERED DRUGS (ALT 637 FOR MEDICARE OP): Performed by: MIDWIFE

## 2023-12-01 PROCEDURE — 51701 INSERT BLADDER CATHETER: CPT

## 2023-12-01 PROCEDURE — 2500000004 HC RX 250 GENERAL PHARMACY W/ HCPCS (ALT 636 FOR OP/ED): Performed by: MIDWIFE

## 2023-12-01 PROCEDURE — 96372 THER/PROPH/DIAG INJ SC/IM: CPT | Performed by: MIDWIFE

## 2023-12-01 PROCEDURE — 7210000002 HC LABOR PER HOUR

## 2023-12-01 PROCEDURE — 1210000001 HC SEMI-PRIVATE ROOM DAILY

## 2023-12-01 PROCEDURE — 94760 N-INVAS EAR/PLS OXIMETRY 1: CPT

## 2023-12-01 RX ORDER — LOPERAMIDE HYDROCHLORIDE 2 MG/1
4 CAPSULE ORAL EVERY 2 HOUR PRN
Status: DISCONTINUED | OUTPATIENT
Start: 2023-12-01 | End: 2023-12-02 | Stop reason: HOSPADM

## 2023-12-01 RX ORDER — SIMETHICONE 80 MG
80 TABLET,CHEWABLE ORAL 4 TIMES DAILY PRN
Status: DISCONTINUED | OUTPATIENT
Start: 2023-12-01 | End: 2023-12-02 | Stop reason: HOSPADM

## 2023-12-01 RX ORDER — HYDRALAZINE HYDROCHLORIDE 20 MG/ML
5 INJECTION INTRAMUSCULAR; INTRAVENOUS ONCE AS NEEDED
Status: DISCONTINUED | OUTPATIENT
Start: 2023-12-01 | End: 2023-12-02 | Stop reason: HOSPADM

## 2023-12-01 RX ORDER — MISOPROSTOL 200 UG/1
800 TABLET ORAL ONCE AS NEEDED
Status: DISCONTINUED | OUTPATIENT
Start: 2023-12-01 | End: 2023-12-02 | Stop reason: HOSPADM

## 2023-12-01 RX ORDER — ENOXAPARIN SODIUM 100 MG/ML
40 INJECTION SUBCUTANEOUS EVERY 24 HOURS
Status: DISCONTINUED | OUTPATIENT
Start: 2023-12-01 | End: 2023-12-02 | Stop reason: HOSPADM

## 2023-12-01 RX ORDER — LABETALOL HYDROCHLORIDE 5 MG/ML
20 INJECTION, SOLUTION INTRAVENOUS ONCE AS NEEDED
Status: DISCONTINUED | OUTPATIENT
Start: 2023-12-01 | End: 2023-12-02 | Stop reason: HOSPADM

## 2023-12-01 RX ORDER — OXYTOCIN/0.9 % SODIUM CHLORIDE 30/500 ML
60 PLASTIC BAG, INJECTION (ML) INTRAVENOUS ONCE AS NEEDED
Status: DISCONTINUED | OUTPATIENT
Start: 2023-12-01 | End: 2023-12-02 | Stop reason: HOSPADM

## 2023-12-01 RX ORDER — OXYTOCIN 10 [USP'U]/ML
10 INJECTION, SOLUTION INTRAMUSCULAR; INTRAVENOUS ONCE AS NEEDED
Status: DISCONTINUED | OUTPATIENT
Start: 2023-12-01 | End: 2023-12-02 | Stop reason: HOSPADM

## 2023-12-01 RX ORDER — TRANEXAMIC ACID 100 MG/ML
1000 INJECTION, SOLUTION INTRAVENOUS ONCE AS NEEDED
Status: DISCONTINUED | OUTPATIENT
Start: 2023-12-01 | End: 2023-12-02 | Stop reason: HOSPADM

## 2023-12-01 RX ORDER — DIPHENHYDRAMINE HCL 25 MG
25 CAPSULE ORAL EVERY 6 HOURS PRN
Status: DISCONTINUED | OUTPATIENT
Start: 2023-12-01 | End: 2023-12-02 | Stop reason: HOSPADM

## 2023-12-01 RX ORDER — ONDANSETRON HYDROCHLORIDE 2 MG/ML
4 INJECTION, SOLUTION INTRAVENOUS EVERY 6 HOURS PRN
Status: DISCONTINUED | OUTPATIENT
Start: 2023-12-01 | End: 2023-12-02 | Stop reason: HOSPADM

## 2023-12-01 RX ORDER — METHYLERGONOVINE MALEATE 0.2 MG/ML
0.2 INJECTION INTRAVENOUS ONCE AS NEEDED
Status: DISCONTINUED | OUTPATIENT
Start: 2023-12-01 | End: 2023-12-02 | Stop reason: HOSPADM

## 2023-12-01 RX ORDER — ACETAMINOPHEN 325 MG/1
975 TABLET ORAL EVERY 6 HOURS
Status: DISCONTINUED | OUTPATIENT
Start: 2023-12-01 | End: 2023-12-02 | Stop reason: HOSPADM

## 2023-12-01 RX ORDER — ONDANSETRON 4 MG/1
4 TABLET, FILM COATED ORAL EVERY 6 HOURS PRN
Status: DISCONTINUED | OUTPATIENT
Start: 2023-12-01 | End: 2023-12-02 | Stop reason: HOSPADM

## 2023-12-01 RX ORDER — DIPHENHYDRAMINE HYDROCHLORIDE 50 MG/ML
25 INJECTION INTRAMUSCULAR; INTRAVENOUS EVERY 6 HOURS PRN
Status: DISCONTINUED | OUTPATIENT
Start: 2023-12-01 | End: 2023-12-02 | Stop reason: HOSPADM

## 2023-12-01 RX ORDER — IBUPROFEN 600 MG/1
600 TABLET ORAL EVERY 6 HOURS
Status: DISCONTINUED | OUTPATIENT
Start: 2023-12-01 | End: 2023-12-02 | Stop reason: HOSPADM

## 2023-12-01 RX ORDER — NIFEDIPINE 10 MG/1
10 CAPSULE ORAL ONCE AS NEEDED
Status: DISCONTINUED | OUTPATIENT
Start: 2023-12-01 | End: 2023-12-02 | Stop reason: HOSPADM

## 2023-12-01 RX ORDER — LIDOCAINE 560 MG/1
1 PATCH PERCUTANEOUS; TOPICAL; TRANSDERMAL
Status: DISCONTINUED | OUTPATIENT
Start: 2023-12-01 | End: 2023-12-02 | Stop reason: HOSPADM

## 2023-12-01 RX ORDER — BISACODYL 10 MG/1
10 SUPPOSITORY RECTAL DAILY PRN
Status: DISCONTINUED | OUTPATIENT
Start: 2023-12-01 | End: 2023-12-02 | Stop reason: HOSPADM

## 2023-12-01 RX ORDER — POLYETHYLENE GLYCOL 3350 17 G/17G
17 POWDER, FOR SOLUTION ORAL 2 TIMES DAILY PRN
Status: DISCONTINUED | OUTPATIENT
Start: 2023-12-01 | End: 2023-12-02 | Stop reason: HOSPADM

## 2023-12-01 RX ORDER — ADHESIVE BANDAGE
10 BANDAGE TOPICAL
Status: DISCONTINUED | OUTPATIENT
Start: 2023-12-01 | End: 2023-12-02 | Stop reason: HOSPADM

## 2023-12-01 RX ORDER — CARBOPROST TROMETHAMINE 250 UG/ML
250 INJECTION, SOLUTION INTRAMUSCULAR ONCE AS NEEDED
Status: DISCONTINUED | OUTPATIENT
Start: 2023-12-01 | End: 2023-12-02 | Stop reason: HOSPADM

## 2023-12-01 RX ADMIN — ACETAMINOPHEN 975 MG: 325 TABLET ORAL at 21:59

## 2023-12-01 RX ADMIN — ACETAMINOPHEN 975 MG: 325 TABLET ORAL at 08:47

## 2023-12-01 RX ADMIN — IBUPROFEN 600 MG: 600 TABLET ORAL at 15:49

## 2023-12-01 RX ADMIN — IBUPROFEN 600 MG: 600 TABLET ORAL at 02:03

## 2023-12-01 RX ADMIN — IBUPROFEN 600 MG: 600 TABLET ORAL at 21:59

## 2023-12-01 RX ADMIN — ACETAMINOPHEN 975 MG: 325 TABLET ORAL at 02:03

## 2023-12-01 RX ADMIN — ENOXAPARIN SODIUM 40 MG: 100 INJECTION SUBCUTANEOUS at 13:49

## 2023-12-01 RX ADMIN — ACETAMINOPHEN 975 MG: 325 TABLET ORAL at 15:50

## 2023-12-01 RX ADMIN — IBUPROFEN 600 MG: 600 TABLET ORAL at 08:47

## 2023-12-01 ASSESSMENT — ACTIVITIES OF DAILY LIVING (ADL): LACK_OF_TRANSPORTATION: NO

## 2023-12-01 ASSESSMENT — PAIN SCALES - GENERAL
PAINLEVEL_OUTOF10: 3
PAINLEVEL_OUTOF10: 3
PAIN_LEVEL: 3
PAINLEVEL_OUTOF10: 0 - NO PAIN
PAINLEVEL_OUTOF10: 3
PAINLEVEL_OUTOF10: 5 - MODERATE PAIN
PAINLEVEL_OUTOF10: 0 - NO PAIN
PAINLEVEL_OUTOF10: 0 - NO PAIN

## 2023-12-01 ASSESSMENT — PAIN - FUNCTIONAL ASSESSMENT
PAIN_FUNCTIONAL_ASSESSMENT: 0-10
PAIN_FUNCTIONAL_ASSESSMENT: 0-10

## 2023-12-01 ASSESSMENT — PAIN DESCRIPTION - LOCATION: LOCATION: ABDOMEN

## 2023-12-01 NOTE — L&D DELIVERY NOTE
OB Delivery Note  2023  Luciana Allen  23 y.o.   Vaginal, Spontaneous        Gestational Age: 39w2d  /Para:   Quantitative Blood Loss: Admission to Discharge: 320 mL (2023  7:44 AM - 2023  6:53 AM)    Audelia Allen [[id number]]      Labor Events    Rupture date/time: 2023 1340  Rupture type: Artificial  Fluid color: Clear  Fluid odor: None  Labor type: Induced Onset of Labor  Labor allowed to proceed with plans for an attempted vaginal birth?: Yes  Induction: Gomez/EASI, Oxytocin  Induction indications: Other  Complications: None       Labor Event Times    Labor onset date/time: 2023 1111  Dilation complete date/time: 2023  Start pushing date/time: 2023       Labor Length    1st stage: 13h 39m  2nd stage: 0h 07m  3rd stage: 0h 04m       Placenta    Placenta delivery date/time: 2023 010  Placenta removal: Spontaneous  Placenta appearance: Intact  Placenta disposition: discarded       Cord    Vessels: 3 vessels  Complications: Nuchal, Wrapped  Nuchal cord description: tight nuchal cord  Cord around: head, shoulders  Number of loops: 1  Delayed cord clamping?: Yes  Cord blood disposition: Lab  Gases sent?: No       Lacerations    Episiotomy: None  Perineal laceration: None  Other lacerations?: No  Repair suture: None       Anesthesia    Method: Epidural       Operative Delivery    Forceps attempted?: No  Vacuum extractor attempted?: No       Shoulder Dystocia    Shoulder dystocia present?: No        Delivery    Birth date/time: 2023 00:57:00  Delivery type: Vaginal, Spontaneous  Complications: None       Resuscitation    Method: Tactile stimulation, Suctioning       Apgars    Living status: Living  Apgar Component Scores:  1 min.:  5 min.:  10 min.:  15 min.:  20 min.:    Skin color:  1  1       Heart rate:  2  2       Reflex irritability:  1  2       Muscle tone:  2  2       Respiratory effort:  1  1       Total:  7  8       Apgars  assigned by: KASSI SCANLON       Delivery Providers    Delivering clinician: TONNY Farrell   Provider Role    Dai Multani, RN Delivery Nurse    Ev Ramirez, RN Nursery Nurse     Resident                 TONNY Farrell

## 2023-12-01 NOTE — PROGRESS NOTES
S: Working epidural in place. Reports intermittent pressure.    O: SVE: 6/70/-2      Short Pump: John q2-3      FHR: 140, moderate variability, +accelerations, no decelerations noted    A: 24yo  at 39.1 per LMP      Normotensive      GBS-      Latent labor     P: Continue pitocin titration per guidelines --> Currently at 12mu      Encourage maternal positioning for labor/comfort      Anticipate

## 2023-12-01 NOTE — PROGRESS NOTES
[place] of care    S: Patient asleep with working epidural in place. Support at bedside.    O: SVE: Deferred      Esto: John q4      FHR: 135, moderate variability, +accelerations, no decels noted    A: 22yo  at 39.1 per LMP      Normotensive      GBS-      Latent labor    P: Reviewed vitals, labs      CEFM      Continue pitocin titration per guidelines --> Currently at 10mu      Consider AROM when patient awake      Anticipate

## 2023-12-01 NOTE — ANESTHESIA POSTPROCEDURE EVALUATION
Patient: Luciana Allen    Procedure Summary       Date: 11/30/23 Room / Location:     Anesthesia Start: 1700 Anesthesia Stop: 12/01/23 0057    Procedure: Labor Analgesia Diagnosis:     Scheduled Providers:  Responsible Provider: BROOKS Banegas    Anesthesia Type: epidural ASA Status: 3            Anesthesia Type: epidural    Vitals Value Taken Time   /90 12/01/23 1454   Temp 36.2 12/01/23 1454   Pulse 63 12/01/23 1454   Resp 18 12/01/23 1454   SpO2 99 12/01/23 1454       Anesthesia Post Evaluation    Patient location during evaluation: bedside  Patient participation: complete - patient participated  Level of consciousness: awake and alert  Pain score: 3  Pain management: adequate  Airway patency: patent  Cardiovascular status: acceptable  Respiratory status: acceptable  Hydration status: acceptable  Postoperative Nausea and Vomiting: none        No notable events documented.

## 2023-12-02 VITALS
BODY MASS INDEX: 36.7 KG/M2 | RESPIRATION RATE: 18 BRPM | SYSTOLIC BLOOD PRESSURE: 115 MMHG | HEART RATE: 62 BPM | OXYGEN SATURATION: 100 % | WEIGHT: 215 LBS | HEIGHT: 64 IN | DIASTOLIC BLOOD PRESSURE: 83 MMHG | TEMPERATURE: 97.2 F

## 2023-12-02 PROBLEM — Z34.90 ENCOUNTER FOR INDUCTION OF LABOR (HHS-HCC): Status: RESOLVED | Noted: 2023-11-30 | Resolved: 2023-12-02

## 2023-12-02 PROBLEM — T88.3XXA MALIGNANT HYPERTHERMIA: Status: RESOLVED | Noted: 2023-11-30 | Resolved: 2023-12-02

## 2023-12-02 PROCEDURE — 94760 N-INVAS EAR/PLS OXIMETRY 1: CPT

## 2023-12-02 PROCEDURE — 2500000001 HC RX 250 WO HCPCS SELF ADMINISTERED DRUGS (ALT 637 FOR MEDICARE OP): Performed by: MIDWIFE

## 2023-12-02 PROCEDURE — 99238 HOSP IP/OBS DSCHRG MGMT 30/<: CPT | Performed by: STUDENT IN AN ORGANIZED HEALTH CARE EDUCATION/TRAINING PROGRAM

## 2023-12-02 RX ORDER — ONDANSETRON 4 MG/1
4 TABLET, FILM COATED ORAL EVERY 6 HOURS PRN
Qty: 40 TABLET | Refills: 1 | Status: SHIPPED | OUTPATIENT
Start: 2023-12-02 | End: 2024-02-05 | Stop reason: WASHOUT

## 2023-12-02 RX ORDER — POLYETHYLENE GLYCOL 3350 17 G/17G
17 POWDER, FOR SOLUTION ORAL 2 TIMES DAILY PRN
Qty: 14 PACKET | Refills: 0 | Status: SHIPPED | OUTPATIENT
Start: 2023-12-02 | End: 2024-02-05 | Stop reason: WASHOUT

## 2023-12-02 RX ORDER — IBUPROFEN 600 MG/1
600 TABLET ORAL EVERY 6 HOURS PRN
Qty: 30 TABLET | Refills: 1 | Status: SHIPPED | OUTPATIENT
Start: 2023-12-02

## 2023-12-02 RX ADMIN — IBUPROFEN 600 MG: 600 TABLET ORAL at 10:14

## 2023-12-02 RX ADMIN — ACETAMINOPHEN 975 MG: 325 TABLET ORAL at 10:13

## 2023-12-02 RX ADMIN — ACETAMINOPHEN 975 MG: 325 TABLET ORAL at 04:20

## 2023-12-02 RX ADMIN — IBUPROFEN 600 MG: 600 TABLET ORAL at 04:20

## 2023-12-02 ASSESSMENT — PAIN SCALES - GENERAL
PAINLEVEL_OUTOF10: 0 - NO PAIN
PAINLEVEL_OUTOF10: 3

## 2023-12-02 NOTE — DISCHARGE INSTRUCTIONS
Call your provider if you experience:    Notify provider for bad-smelling vaginal blood or discharge  Notify provider for blurry vision or spots before your eyes  Notify provider for fever or chills  Notify provider for headache that does not get better, even after taking medicine  Notify provider for heavy bleeding  Soaking a large pad every hour or passing large clots.  Notify provider for pain, burning or difficulty with emptying your bladder  Notify provider for red or swollen breast that is painful or warm to touch  Notify provider for red or swollen leg that is painful or warm to touch  Notify provider for signs of depression  Examples include: Persistent sadness, frequent crying, sleep problems, excessive worrying, feeling unable to cope.  Trust your instincts. Always get medical care if you are not feeling well  Or if you have questions or concerns.    Thank you for choosing us for your birth journey

## 2023-12-02 NOTE — NURSING NOTE
Maternal and  discharge instructions reviewed with patient and significant other. Patient expressed understanding, voice no questions or concerns prior to discharge.

## 2023-12-02 NOTE — PROGRESS NOTES
Postpartum Progress Note    Assessment/Plan   Luciana Allen is a 23 y.o., , who delivered at 39w2d gestation and is now postpartum day 1.    Post-partum   -she is doing well, no concerns. She is bottle feeding   -VSS. Physical exam is benign.   -Patient plans on following up for birth control.    Principal Problem:    Encounter for induction of labor  Active Problems:    Valvular heart disease    Malignant hyperthermia    Intermittent asthma    Pregnancy Problems (from 23 to present)       Problem Noted Resolved    Encounter for induction of labor 2023 by TONNY Prado No    Priority:  Medium      Alba Little contractions 2023 by TONNY Caldera No    Priority:  Medium      37 weeks gestation of pregnancy 2023 by Sigrid Yousif MD No    Priority:  Medium      33 weeks gestation of pregnancy 10/18/2023 by Tristan Ramirez MD No    Priority:  Medium      Overview Addendum 2023  8:00 PM by Tristan Ramirez MD     History of bipolar and depression, PTSD:  Ultrasound : EFW 2.53 kg, 25%, normal ISIDRA, BPP          Oligohydramnios 2023 by TONNY Johnson 2023 by TONNY Johnson    Pelvic pressure in pregnancy 2023 by TONNY Johnson 2023 by TONNY Johnson          Hospital course: no complications  Vaginal Birth  Patient is not breastfeedingThe patient's blood type is O POS. The baby's blood type is O POS . Rhogam is not indicated.    Subjective   Her pain is well controlled with current medications  She is passing flatus  She is ambulating well  She is tolerating a Adult diet Regular  She reports no breast or nursing problems  She denies emotional concerns today   Her plan for contraception is none         Objective   Allergies:   Latex         Last Vitals:  Temp Pulse Resp BP MAP Pulse Ox   36.2 °C (97.2 °F) 62 18 115/83   100 %     Vitals Min/Max Last 24 Hours:  Temp  Min: 36.2 °C (97.2  °F)  Max: 36.8 °C (98.2 °F)  Pulse  Min: 61  Max: 73  Resp  Min: 18  Max: 18  BP  Min: 98/61  Max: 116/90    Intake/Output:   No intake or output data in the 24 hours ending 12/02/23 1012    Physical Exam:  General: A&Ox3  Head: Normocephalic, atraumatic  Heart/Lungs: Even chest rise, no increased work of breathing.  Abdomen: Soft, nontender. BS+4. Gravid uterus 2 below umbilicus. No bruising or masses.  Lower Extremities: No lower extremity Edema no palpable cords.       Lab Data:  Labs in chart were reviewed.

## 2023-12-02 NOTE — DISCHARGE SUMMARY
Discharge Summary    Admission Date: 11/30/2023  Discharge Date: 12/2/23    Discharge Diagnosis  Encounter for induction of labor    Hospital Course  Delivery Date: 12/1/2023  12:57 AM   Delivery type: Vaginal, Spontaneous    GA at delivery: 39w2d  Outcome: Living   Anesthesia during delivery: Epidural   Intrapartum complications: None   Feeding method: Breastfeeding Status: No     Procedures: none  Contraception at discharge: none      Pertinent Physical Exam At Time of Discharge  General: A&Ox3  Head: Normocephalic, atraumatic  Heart/Lungs: Even chest rise, no increased work of breathing.  Abdomen: Soft, nontender. BS+4. No bruising or masses.  Genitourinary: Labia and vagina normal in appearance. Uterus is 2 below the umbilicus. No adnexal masses palpated.  Lower Extremities: No lower extremity Edema no palpable cords.       Discharge Meds     Your medication list        START taking these medications        Instructions Last Dose Given Next Dose Due   ibuprofen 600 mg tablet      Take 1 tablet (600 mg) by mouth every 6 hours if needed for mild pain (1 - 3).       ondansetron 4 mg tablet  Commonly known as: Zofran      Take 1 tablet (4 mg) by mouth every 6 hours if needed for nausea or vomiting.       polyethylene glycol 17 gram packet  Commonly known as: Glycolax, Miralax      Take 17 g by mouth 2 times a day as needed (first line).              CONTINUE taking these medications        Instructions Last Dose Given Next Dose Due   albuterol 90 mcg/actuation inhaler                  STOP taking these medications      PRENATAL ORAL        TylenoL 325 mg capsule  Generic drug: acetaminophen                  Where to Get Your Medications        These medications were sent to [id number] - [address] AT [facility] [address] & [address] [address]      Phone: [phone]   ibuprofen 600 mg tablet  ondansetron 4 mg tablet  polyethylene glycol 17 gram packet             Test Results Pending At Discharge  Pending Labs       No current pending labs.            Outpatient Follow-Up  No future appointments.        Ceasar White MD

## 2023-12-02 NOTE — NURSING NOTE
Patient feeling well, denies any concerns prior to discharge home this afternoon.  swaddled and asleep in visitor's arms; breathing easily with no signs of distress noted. Patient declined Lovenox at this time, states that she is up walking often. Medication education provided; Lovenox marked as not given in MAR.

## 2023-12-02 NOTE — CARE PLAN
Problem: Postpartum  Goal: Experiences normal postpartum course  Outcome: Met  Goal: Appropriate maternal -  bonding  Outcome: Met  Goal: Establish and maintain infant feeding pattern for adequate nutrition  Outcome: Met  Goal: Incisions, wounds, or drain sites healing without S/S of infection  Outcome: Met  Goal: No s/sx infection  Outcome: Met  Goal: No s/sx of hemorrhage  Outcome: Met  Goal: Minimal s/sx of HDP and BP<160/110  Outcome: Met   The patient's goals for the shift include Discharge    The clinical goals for the shift include Meet discharge criteria

## 2023-12-04 ENCOUNTER — APPOINTMENT (OUTPATIENT)
Dept: OBSTETRICS AND GYNECOLOGY | Facility: CLINIC | Age: 23
End: 2023-12-04
Payer: MEDICAID

## 2023-12-05 ENCOUNTER — TELEPHONE (OUTPATIENT)
Dept: OBSTETRICS AND GYNECOLOGY | Facility: CLINIC | Age: 23
End: 2023-12-05
Payer: MEDICAID

## 2023-12-05 NOTE — TELEPHONE ENCOUNTER
Order placed for breast pump through [employer]. On Dr. Yousif desk for signature then will fax to them.

## 2023-12-18 ENCOUNTER — POSTPARTUM VISIT (OUTPATIENT)
Dept: OBSTETRICS AND GYNECOLOGY | Facility: CLINIC | Age: 23
End: 2023-12-18
Payer: MEDICAID

## 2023-12-18 VITALS
SYSTOLIC BLOOD PRESSURE: 110 MMHG | WEIGHT: 200.4 LBS | BODY MASS INDEX: 33.39 KG/M2 | HEIGHT: 65 IN | DIASTOLIC BLOOD PRESSURE: 78 MMHG

## 2023-12-18 PROCEDURE — 0503F POSTPARTUM CARE VISIT: CPT | Performed by: OBSTETRICS & GYNECOLOGY

## 2023-12-18 RX ORDER — ARIPIPRAZOLE 2 MG/1
2 TABLET ORAL DAILY
COMMUNITY

## 2023-12-18 NOTE — PROGRESS NOTES
Subjective   Patient ID: Luciana Allen is a 23 y.o. female who presents for No chief complaint on file..  HPI Luciana is 2 and half weeks postpartum status post spontaneous vaginal delivery.  She is breast-feeding, her chief complaint is fatigue and feeling depressed.  She says she has contacted her counselor and has restarted Abilify and that is helping.  She denies any suicidal thoughts.  She has stopped bleeding with very little spotting denies any pain.  She reports normal urination and bowel movement.  We discussed contraception and she is interested to get a Mirena IUD.    Review of Systems   All other systems reviewed and are negative.      Objective   Physical Exam  Constitutional:       Appearance: Normal appearance.   Cardiovascular:      Rate and Rhythm: Normal rate and regular rhythm.   Pulmonary:      Effort: Pulmonary effort is normal.      Breath sounds: Normal breath sounds.   Abdominal:      General: Abdomen is flat.      Comments: Uterus down in pelvis   Neurological:      Mental Status: She is alert.   Psychiatric:         Mood and Affect: Mood normal.         Behavior: Behavior normal.         Thought Content: Thought content normal.         Judgment: Judgment normal.         Assessment/Plan   Problem List Items Addressed This Visit    None    Doing well, reassured her.  She has a visit with her counselor in few days and will discuss increasing Abilify dose. Folow up in 2-3 weeks for Mirena IUD insertion.        Sigrid Yousif MD 12/18/23 3:06 PM

## 2024-01-08 ENCOUNTER — POSTPARTUM VISIT (OUTPATIENT)
Dept: OBSTETRICS AND GYNECOLOGY | Facility: CLINIC | Age: 24
End: 2024-01-08
Payer: MEDICAID

## 2024-01-08 VITALS
WEIGHT: 203 LBS | SYSTOLIC BLOOD PRESSURE: 114 MMHG | BODY MASS INDEX: 33.82 KG/M2 | DIASTOLIC BLOOD PRESSURE: 84 MMHG | HEIGHT: 65 IN

## 2024-01-08 DIAGNOSIS — Z30.430 ENCOUNTER FOR INSERTION OF MIRENA IUD: Primary | ICD-10-CM

## 2024-01-08 PROCEDURE — 87800 DETECT AGNT MULT DNA DIREC: CPT

## 2024-01-08 PROCEDURE — 58300 INSERT INTRAUTERINE DEVICE: CPT | Performed by: OBSTETRICS & GYNECOLOGY

## 2024-01-08 ASSESSMENT — EDINBURGH POSTNATAL DEPRESSION SCALE (EPDS)
I HAVE LOOKED FORWARD WITH ENJOYMENT TO THINGS: AS MUCH AS I EVER DID
I HAVE BEEN ANXIOUS OR WORRIED FOR NO GOOD REASON: NO, NOT AT ALL
I HAVE BLAMED MYSELF UNNECESSARILY WHEN THINGS WENT WRONG: NO, NEVER
I HAVE BEEN ABLE TO LAUGH AND SEE THE FUNNY SIDE OF THINGS: AS MUCH AS I ALWAYS COULD

## 2024-01-08 NOTE — PROGRESS NOTES
Subjective   Patient ID: Luciana Allen is a 23 y.o. female who presents for No chief complaint on file..  HPI is a 23-year-old 6 weeks postpartum here for final postpartum check and insertion of Mirena IUD.  She states that she is still breast-feeding baby is doing well.  Baby is in the room with her.  She had some postpartum depression as well and was started on Zoloft and she is doing much better.  She wants to proceed with Mirena IUD insertion today.  She has not resumed sexual activity and says just finished a cycle.   She has not resumed sexual activity yet.  She reports some right leg numbness since her delivery instructed to do some stretches and let me know at her follow up if still issues I will refer her for physical therapy.     Review of Systems   All other systems reviewed and are negative.      Objective   Physical Exam  Constitutional:       Appearance: Normal appearance.   Cardiovascular:      Rate and Rhythm: Normal rate and regular rhythm.   Pulmonary:      Effort: Pulmonary effort is normal.      Breath sounds: Normal breath sounds.   Genitourinary:     General: Normal vulva.      Vagina: Normal.      Cervix: Normal.      Uterus: Normal.       Adnexa: Right adnexa normal and left adnexa normal.   Neurological:      Mental Status: She is alert.   Patient ID: Luciana Allen is a 23 y.o. female.    IUD Insertion    Date/Time: 1/8/2024 4:27 PM    Performed by: Sigrid Yousif MD  Authorized by: Sigrid Yousif MD    Consent:     Consent obtained:  Verbal and written    Consent given by:  Patient    Procedure risks and benefits discussed: yes      Patient questions answered: yes      Patient agrees, verbalizes understanding, and wants to proceed: yes      Instructions and paperwork completed: yes    Procedure:     Pelvic exam performed: yes      Negative GC/chlamydia test: yes      Negative urine pregnancy test: yes      Negative serum pregnancy test: no      Cervix cleaned and prepped: yes       Speculum placed in vagina: yes      Tenaculum applied to cervix: no      IUD inserted with no complications: yes      IUD type:  Mirena    Strings trimmed: yes        Assessment/Plan   Problem List Items Addressed This Visit             ICD-10-CM    Encounter for insertion of mirena IUD - Primary Z30.430    Relevant Orders    C. Trachomatis / N. Gonorrhoeae, Amplified Detection    Postpartum exam Z39.2     Cervical cultures were done and Mirena IUD inserted without any problems.  I have reviewed instructions with her and have recommended she comes back in 4 weeks for IUD check.  Also recommend that she go some stretches at home and let me know  Is anything she needs any physical therapy referral.       Sigrid Yousif MD 01/08/24 4:28 PM

## 2024-01-09 LAB
C TRACH RRNA SPEC QL NAA+PROBE: NEGATIVE
N GONORRHOEA DNA SPEC QL PROBE+SIG AMP: NEGATIVE

## 2024-02-05 ENCOUNTER — OFFICE VISIT (OUTPATIENT)
Dept: OBSTETRICS AND GYNECOLOGY | Facility: CLINIC | Age: 24
End: 2024-02-05
Payer: MEDICAID

## 2024-02-05 VITALS
WEIGHT: 209.4 LBS | DIASTOLIC BLOOD PRESSURE: 88 MMHG | BODY MASS INDEX: 34.89 KG/M2 | HEIGHT: 65 IN | SYSTOLIC BLOOD PRESSURE: 124 MMHG

## 2024-02-05 DIAGNOSIS — Z30.431 IUD CHECK UP: Primary | ICD-10-CM

## 2024-02-05 PROCEDURE — 99213 OFFICE O/P EST LOW 20 MIN: CPT | Performed by: OBSTETRICS & GYNECOLOGY

## 2024-02-05 NOTE — PROGRESS NOTES
Subjective   Patient ID: Luciana Allen is a 23 y.o. female who presents for No chief complaint on file..  JAN Chowdhury is a 23-year-old G3, P3 2 to 3 months postpartum who had a Mirena IUD inserted last month is here for checkup.  She is very happy with the IUD reports no significant bleeding or pain.  Baby is doing well.    Review of Systems   All other systems reviewed and are negative.      Objective   Physical Exam  Constitutional:       Appearance: Normal appearance.   Pulmonary:      Effort: Pulmonary effort is normal.   Abdominal:      General: Abdomen is flat.      Palpations: Abdomen is soft.   Genitourinary:     General: Normal vulva.      Vagina: Normal.      Cervix: Normal.      Comments: IUD string visualized normal  Neurological:      Mental Status: She is alert.         Assessment/Plan   Problem List Items Addressed This Visit             ICD-10-CM    IUD check up - Primary Z30.431   IUD strings visualized and is within normal limits.  Small amount of mucousy blood noticed otherwise negative.  Follow-up as needed.  Yearly.         Sigrid Yousif MD 02/05/24 1:47 PM

## 2024-06-25 ENCOUNTER — APPOINTMENT (OUTPATIENT)
Dept: OBSTETRICS AND GYNECOLOGY | Facility: CLINIC | Age: 24
End: 2024-06-25
Payer: MEDICAID